# Patient Record
Sex: MALE | Race: WHITE | Employment: UNEMPLOYED | URBAN - METROPOLITAN AREA
[De-identification: names, ages, dates, MRNs, and addresses within clinical notes are randomized per-mention and may not be internally consistent; named-entity substitution may affect disease eponyms.]

---

## 2017-08-18 ENCOUNTER — OFFICE VISIT (OUTPATIENT)
Dept: FAMILY MEDICINE CLINIC | Facility: CLINIC | Age: 57
End: 2017-08-18

## 2017-08-18 VITALS
SYSTOLIC BLOOD PRESSURE: 124 MMHG | WEIGHT: 205 LBS | RESPIRATION RATE: 18 BRPM | HEIGHT: 75 IN | HEART RATE: 78 BPM | BODY MASS INDEX: 25.49 KG/M2 | OXYGEN SATURATION: 98 % | DIASTOLIC BLOOD PRESSURE: 70 MMHG | TEMPERATURE: 98 F

## 2017-08-18 DIAGNOSIS — J01.00 ACUTE NON-RECURRENT MAXILLARY SINUSITIS: Primary | ICD-10-CM

## 2017-08-18 PROCEDURE — 99213 OFFICE O/P EST LOW 20 MIN: CPT | Performed by: NURSE PRACTITIONER

## 2017-08-18 RX ORDER — DOXYCYCLINE HYCLATE 100 MG
100 TABLET ORAL 2 TIMES DAILY
Qty: 14 TABLET | Refills: 0 | Status: SHIPPED | OUTPATIENT
Start: 2017-08-18 | End: 2017-08-25

## 2017-08-18 NOTE — PROGRESS NOTES
CHIEF COMPLAINT:   Patient presents with:  Sinus Problem: sinus pain, congestion x 10 days       HPI:   Judson Fritz is a 61year old male who presents for sinus congestion for  10  days. Symptoms have been worsening since onset.  Sinus congestion/pain is chest pain or palpitations   GI: denies N/V/C or abdominal pain  NEURO: + sinus headaches. No numbness or tingling in face.     EXAM:   /70 (BP Location: Left arm, Patient Position: Sitting, Cuff Size: adult)   Pulse 78   Temp 98 °F (36.7 °C) (Oral) and discussed. Patient Instructions     Acute Sinusitis    Acute sinusitis is irritation and swelling of the sinuses. It is usually caused by a viral infection after a common cold. Your doctor can help you find relief. What is acute sinusitis?   Sinuses 10/1/2016  © 7755-7653 The [address] [place] [address], [address] [place] [address]. All rights reserved. This information is not intended as a substitute for professional medical care. Always follow your healthcare professional's instructions.

## 2017-08-18 NOTE — PATIENT INSTRUCTIONS
Acute Sinusitis    Acute sinusitis is irritation and swelling of the sinuses. It is usually caused by a viral infection after a common cold. Your doctor can help you find relief. What is acute sinusitis?   Sinuses are air-filled spaces in the skull behin [facility]. [facility], [address] [place], [place] [address] [employer]. All rights reserved. This information is not intended as a substitute for professional medical care. Always follow your healthcare professional's instructions.

## 2017-09-11 ENCOUNTER — HOSPITAL ENCOUNTER (EMERGENCY)
Age: 57
Discharge: HOME | End: 2017-09-11
Attending: EMERGENCY MEDICINE
Payer: MEDICAID

## 2017-09-11 VITALS
RESPIRATION RATE: 18 BRPM | TEMPERATURE: 98 F | HEIGHT: 75 IN | WEIGHT: 205 LBS | DIASTOLIC BLOOD PRESSURE: 51 MMHG | HEART RATE: 67 BPM | OXYGEN SATURATION: 97 % | BODY MASS INDEX: 25.49 KG/M2 | SYSTOLIC BLOOD PRESSURE: 116 MMHG

## 2017-09-11 DIAGNOSIS — B02.9 HERPES ZOSTER WITHOUT COMPLICATION: Primary | ICD-10-CM

## 2017-09-11 PROCEDURE — 99283 EMERGENCY DEPT VISIT LOW MDM: CPT

## 2017-09-11 RX ORDER — VALACYCLOVIR HYDROCHLORIDE 1 G/1
1 TABLET, FILM COATED ORAL 3 TIMES DAILY
Qty: 21 TABLET | Refills: 0 | Status: SHIPPED | OUTPATIENT
Start: 2017-09-11 | End: 2017-09-18

## 2017-09-11 NOTE — ED PROVIDER NOTES
Patient Seen in: THE [facility] Emergency Department In [place]    History   Patient presents with:  Rash    Stated Complaint:     HPI    58-year-old male presents emergency department started having pain in a rash on his right buttocks that seem to be spread lymphadenopathy no meningismus no carotid bruit  CV: Regular rate and rhythm no murmur rub  Respiratory: Clear to auscultation bilaterally no crackles no wheezes no accessory muscle use  Abdomen: Soft nontender nondistended, no rebound no guarding  no hepa

## 2018-02-23 ENCOUNTER — HOSPITAL ENCOUNTER (EMERGENCY)
Age: 58
Discharge: HOME | End: 2018-02-23
Attending: EMERGENCY MEDICINE
Payer: MEDICAID

## 2018-02-23 VITALS
OXYGEN SATURATION: 98 % | HEART RATE: 89 BPM | RESPIRATION RATE: 12 BRPM | DIASTOLIC BLOOD PRESSURE: 80 MMHG | WEIGHT: 205 LBS | HEIGHT: 75 IN | TEMPERATURE: 100 F | SYSTOLIC BLOOD PRESSURE: 118 MMHG | BODY MASS INDEX: 25.49 KG/M2

## 2018-02-23 DIAGNOSIS — J01.90 ACUTE SINUSITIS, RECURRENCE NOT SPECIFIED, UNSPECIFIED LOCATION: Primary | ICD-10-CM

## 2018-02-23 PROCEDURE — 99283 EMERGENCY DEPT VISIT LOW MDM: CPT

## 2018-02-23 RX ORDER — HYDROCODONE BITARTRATE AND ACETAMINOPHEN 10; 325 MG/1; MG/1
1 TABLET ORAL EVERY 6 HOURS PRN
COMMUNITY
End: 2019-09-23

## 2018-02-23 RX ORDER — CLARITHROMYCIN 500 MG/1
500 TABLET, COATED ORAL 2 TIMES DAILY
Qty: 20 TABLET | Refills: 0 | Status: SHIPPED | OUTPATIENT
Start: 2018-02-23 | End: 2018-03-05

## 2018-02-23 NOTE — ED PROVIDER NOTES
Patient Seen in: THE [facility] Emergency Department In [place]    History   Patient presents with:  Fever (infectious)    Stated Complaint: fever chill, right face pain, runny nose onset yesterday    HPI    Patient is a 58-year-old male presents emergency ro extraocular motion is intact, sclerae white, conjunctiva is pink. Oropharynx is unremarkable, no exudate. TMs clear bilaterally. Patient with nasal congestion bilaterally.   Left maxillary tenderness to percussion  NECK: Supple, trachea midline, no lympha Prescribed:  Current Discharge Medication List    START taking these medications    clarithromycin 500 MG Oral Tab  Take 1 tablet (500 mg total) by mouth 2 (two) times daily.   Qty: 20 tablet Refills: 0

## 2018-02-23 NOTE — ED INITIAL ASSESSMENT (HPI)
C/o left sided facial pain since yesterday and chills. Also c/o runny nose and congestion. No cough.  Took motrin at [facility]

## 2018-02-25 ENCOUNTER — HOSPITAL ENCOUNTER (EMERGENCY)
Age: 58
Discharge: HOME | End: 2018-02-25
Attending: EMERGENCY MEDICINE
Payer: MEDICAID

## 2018-02-25 VITALS
RESPIRATION RATE: 16 BRPM | HEART RATE: 87 BPM | DIASTOLIC BLOOD PRESSURE: 80 MMHG | OXYGEN SATURATION: 98 % | TEMPERATURE: 99 F | WEIGHT: 205 LBS | SYSTOLIC BLOOD PRESSURE: 121 MMHG | BODY MASS INDEX: 26 KG/M2

## 2018-02-25 DIAGNOSIS — J01.90 ACUTE SINUSITIS, RECURRENCE NOT SPECIFIED, UNSPECIFIED LOCATION: Primary | ICD-10-CM

## 2018-02-25 PROCEDURE — 99282 EMERGENCY DEPT VISIT SF MDM: CPT

## 2018-02-26 NOTE — ED INITIAL ASSESSMENT (HPI)
Pt in er for c/o continued congestion and fever post evaluation of Friday, pt reports being on the antibiotic since Saturday

## 2019-09-23 ENCOUNTER — OFFICE VISIT (OUTPATIENT)
Dept: FAMILY MEDICINE CLINIC | Facility: CLINIC | Age: 59
End: 2019-09-23
Payer: MEDICAID

## 2019-09-23 VITALS
HEART RATE: 95 BPM | TEMPERATURE: 98 F | WEIGHT: 204 LBS | OXYGEN SATURATION: 98 % | BODY MASS INDEX: 25.36 KG/M2 | SYSTOLIC BLOOD PRESSURE: 118 MMHG | DIASTOLIC BLOOD PRESSURE: 82 MMHG | RESPIRATION RATE: 20 BRPM | HEIGHT: 75 IN

## 2019-09-23 DIAGNOSIS — J01.00 ACUTE NON-RECURRENT MAXILLARY SINUSITIS: Primary | ICD-10-CM

## 2019-09-23 DIAGNOSIS — K02.9 DENTAL CARIES: ICD-10-CM

## 2019-09-23 PROCEDURE — 99213 OFFICE O/P EST LOW 20 MIN: CPT | Performed by: NURSE PRACTITIONER

## 2019-09-23 RX ORDER — AMOXICILLIN 875 MG/1
875 TABLET, COATED ORAL 2 TIMES DAILY
Qty: 20 TABLET | Refills: 0 | Status: SHIPPED | OUTPATIENT
Start: 2019-09-23 | End: 2019-10-03

## 2019-09-23 NOTE — PROGRESS NOTES
CHIEF COMPLAINT:   Patient presents with:  Sinus Problem: s/s for 1 week,   Fever: slight. No OTC meds taken      HPI:   Patricia Khoury is a 60year old male who presents for cold symptoms for  1  weeks.  Symptoms have progressed into sinus congestion and b Resp 20   Ht 75\"   Wt 204 lb   SpO2 98%   BMI 25.50 kg/m²   GENERAL: well developed, well nourished,in no apparent distress  SKIN: no rashes,no suspicious lesions  HEAD: atraumatic, normocephalic, + tenderness on palpation of maxillary sinuses  EYES: co sinuses more easily. Have a glass of water every hour or two. A humidifier helps in much the same way. Fluids can also offset the drying effects of certain medicines. If you use a humidifier, follow the product maker's instructions on how to use it.  Clean

## 2022-12-28 ENCOUNTER — APPOINTMENT (OUTPATIENT)
Dept: GENERAL RADIOLOGY | Age: 62
End: 2022-12-28
Attending: EMERGENCY MEDICINE
Payer: MEDICAID

## 2022-12-28 ENCOUNTER — HOSPITAL ENCOUNTER (EMERGENCY)
Age: 62
Discharge: HOME | End: 2022-12-28
Attending: EMERGENCY MEDICINE
Payer: MEDICAID

## 2022-12-28 VITALS
WEIGHT: 190 LBS | TEMPERATURE: 98 F | SYSTOLIC BLOOD PRESSURE: 134 MMHG | DIASTOLIC BLOOD PRESSURE: 87 MMHG | BODY MASS INDEX: 23.62 KG/M2 | HEART RATE: 81 BPM | HEIGHT: 75 IN | RESPIRATION RATE: 16 BRPM | OXYGEN SATURATION: 99 %

## 2022-12-28 DIAGNOSIS — M54.12 CERVICAL RADICULOPATHY: Primary | ICD-10-CM

## 2022-12-28 LAB — TROPONIN I HIGH SENSITIVITY: 14 NG/L

## 2022-12-28 PROCEDURE — 99285 EMERGENCY DEPT VISIT HI MDM: CPT

## 2022-12-28 PROCEDURE — 93005 ELECTROCARDIOGRAM TRACING: CPT

## 2022-12-28 PROCEDURE — 84484 ASSAY OF TROPONIN QUANT: CPT | Performed by: EMERGENCY MEDICINE

## 2022-12-28 PROCEDURE — 96374 THER/PROPH/DIAG INJ IV PUSH: CPT

## 2022-12-28 PROCEDURE — 99284 EMERGENCY DEPT VISIT MOD MDM: CPT

## 2022-12-28 PROCEDURE — 93010 ELECTROCARDIOGRAM REPORT: CPT

## 2022-12-28 PROCEDURE — 72050 X-RAY EXAM NECK SPINE 4/5VWS: CPT | Performed by: EMERGENCY MEDICINE

## 2022-12-28 RX ORDER — PREDNISONE 20 MG/1
40 TABLET ORAL DAILY
Qty: 10 TABLET | Refills: 0 | Status: SHIPPED | OUTPATIENT
Start: 2022-12-28 | End: 2023-01-02

## 2022-12-28 RX ORDER — HYDROMORPHONE HYDROCHLORIDE 1 MG/ML
1 INJECTION, SOLUTION INTRAMUSCULAR; INTRAVENOUS; SUBCUTANEOUS EVERY 30 MIN PRN
Status: DISCONTINUED | OUTPATIENT
Start: 2022-12-28 | End: 2022-12-29

## 2022-12-28 RX ORDER — HYDROCODONE BITARTRATE AND ACETAMINOPHEN 5; 325 MG/1; MG/1
1-2 TABLET ORAL EVERY 6 HOURS PRN
Qty: 10 TABLET | Refills: 0 | Status: SHIPPED | OUTPATIENT
Start: 2022-12-28 | End: 2023-01-02

## 2022-12-28 NOTE — ED INITIAL ASSESSMENT (HPI)
C/o pain/numbness/tingling from neck going down to left shoulder/arm and scapula x 1 month, worse since last week. Not sleeping.

## 2022-12-29 LAB
ATRIAL RATE: 69 BPM
P AXIS: 83 DEGREES
P-R INTERVAL: 148 MS
Q-T INTERVAL: 452 MS
QRS DURATION: 126 MS
QTC CALCULATION (BEZET): 484 MS
R AXIS: 63 DEGREES
T AXIS: 94 DEGREES
VENTRICULAR RATE: 69 BPM

## 2022-12-29 NOTE — DISCHARGE INSTRUCTIONS
Apply ice to sore areas of neck. Take pain medications as prescribed. Follow-up with your PCP or spine surgery this week. Return if any problems.

## (undated) NOTE — ED AVS SNAPSHOT
Lidya Ortiz   MRN: [id number]    Department:  [facility] Emergency Department in [place]   Date of Visit:  2/23/2018           Disclosure     Insurance plans vary and the physician(s) referred by the ER may not be covered by your plan.  Please contact physician (or your personal doctor if your instructions are to return to your personal doctor) about any new or lasting problems. The primary care or specialist physician will see patients referred from the [facility] Emergency Department.  Follow-up c

## (undated) NOTE — ED AVS SNAPSHOT
Theresa Lisa   MRN: [id number]    Department:  THE [facility] Emergency Department in [place]   Date of Visit:  9/11/2017           Disclosure     Insurance plans vary and the physician(s) referred by the ER may not be covered by your plan.  Please contact please fill your prescription right away and begin taking the medication(s) as directed    If the emergency physician has read X-rays, these will be re-interpreted by a radiologist.  If there is a significant change in your reading, you will be contacted.

## (undated) NOTE — ED AVS SNAPSHOT
Volodymyr Priest   MRN: [id number]    Department:  [facility] Emergency Department in [place]   Date of Visit:  2/25/2018           Disclosure     Insurance plans vary and the physician(s) referred by the ER may not be covered by your plan.  Please contact physician (or your personal doctor if your instructions are to return to your personal doctor) about any new or lasting problems. The primary care or specialist physician will see patients referred from the [facility] Emergency Department.  Follow-up c